# Patient Record
Sex: FEMALE | Race: WHITE | NOT HISPANIC OR LATINO | Employment: FULL TIME | URBAN - METROPOLITAN AREA
[De-identification: names, ages, dates, MRNs, and addresses within clinical notes are randomized per-mention and may not be internally consistent; named-entity substitution may affect disease eponyms.]

---

## 2023-09-12 ENCOUNTER — OUTSIDE FACILITY SERVICE (OUTPATIENT)
Dept: CARDIOLOGY | Facility: CLINIC | Age: 48
End: 2023-09-12
Payer: COMMERCIAL

## 2025-02-12 ENCOUNTER — TELEPHONE (OUTPATIENT)
Dept: NEUROLOGY | Facility: CLINIC | Age: 50
End: 2025-02-12
Payer: COMMERCIAL

## 2025-02-12 NOTE — TELEPHONE ENCOUNTER
Pt scheduled with Dr. German for 2/28/25 for dx cerebral ischemia. He will not be in office this day so I called to reschedule and spoke with pt to offer times tomorrow and Friday. She is not available then and opted to cancel and not reschedule at this time.     ALEX Cope

## 2025-04-03 ENCOUNTER — TELEPHONE (OUTPATIENT)
Dept: SURGERY | Facility: CLINIC | Age: 50
End: 2025-04-03
Payer: COMMERCIAL

## 2025-04-03 RX ORDER — POLYETHYLENE GLYCOL 3350 17 G/17G
POWDER, FOR SOLUTION ORAL
Qty: 238 G | Refills: 0 | Status: SHIPPED | OUTPATIENT
Start: 2025-04-03

## 2025-04-03 RX ORDER — BISACODYL 5 MG
TABLET, DELAYED RELEASE (ENTERIC COATED) ORAL
Qty: 4 TABLET | Refills: 0 | Status: SHIPPED | OUTPATIENT
Start: 2025-04-03

## 2025-04-03 NOTE — TELEPHONE ENCOUNTER
Pt would like to be scheduled at [facility] On 05/09 W/ Dr. Torres-verified pharmacy/insurance. Thank you    *I have requested her last colon report from [facility] for review as well*

## 2025-04-03 NOTE — TELEPHONE ENCOUNTER
Received and Indexed pt's last colonoscopy report from [facility]. Pt's last colon was on 05/15/2020

## 2025-04-03 NOTE — TELEPHONE ENCOUNTER
PRESCREENING FOR OPEN ACCESS SCHEDULING    Cayla Obrien, 1975  [id number]    04/03/25    If, the patient answers yes to any of the following questions the provider will be informed prior to scheduling open access for approval and documented in the chart.    [x]  Yes  [] No    1. Have you ever had a colonoscopy in the past?      When:  about 5 yrs       Where: [place]       Polyps or other:     [x]  Yes  [] No    2. Family history of colon cancer?      Relation: Father       Age of onset:       Do you currently have any of the following?    []  Yes  [x] No  Rectal bleeding, if so, how long?     []  Yes  [x] No  Abdominal pain, if so, how long?    []  Yes  [x] No  Constipation, if so, how long?    []  Yes  [x] No  Diarrhea, if so, how long?    []  Yes  [x] No  Weight loss, is so, how much?    [] Yes  [x] No  Small caliber stool, if so, how long?    []Yes  [x] No  Do you have Hemorroids?    []Yes  [x] No  Have you been diagnosed with Anemia?    []Yes  [x] No  Do you have difficulty swallowing?  []Yes  [x] No  Do you have a history of esophageal stricture or dilation?(If yes do not schedule with Dr Sherwood)    []Yes  [x] No  Do you have acid reflux?    Have you ever had any of the following conditions?    [] Yes  [x] No  Heart attack?      When?       Last cardiac workup?     Current Cardiologist? No      Blood thinners?    [] Yes  [x] No   Lung problems, asthma or COPD?  [] Yes  [x] No  Oxygen required?       [] Yes  [x] No  Stroke?     [] Yes  [x] No  Have you ever had a reaction to anesthesia?

## 2025-04-03 NOTE — TELEPHONE ENCOUNTER
Miralax prep sent to pharmacy.  Chart reviewed and ok to proceed as open access. Patient last colon 5 years ago at [facility] with [facility]. Strong family history

## 2025-04-04 ENCOUNTER — PREP FOR SURGERY (OUTPATIENT)
Dept: OTHER | Facility: HOSPITAL | Age: 50
End: 2025-04-04
Payer: COMMERCIAL

## 2025-04-04 DIAGNOSIS — Z12.11 SCREENING FOR COLON CANCER: Primary | ICD-10-CM

## 2025-04-04 NOTE — TELEPHONE ENCOUNTER
PUT IN OR BOOK, INSTRUCTIONS,MAILED, CASE REQUEST SENT   Hemigard Postcare Instructions: The HEMIGARD strips are to remain completely dry for at least 5-7 days.

## 2025-04-09 ENCOUNTER — PREP FOR SURGERY (OUTPATIENT)
Dept: OTHER | Facility: HOSPITAL | Age: 50
End: 2025-04-09
Payer: COMMERCIAL

## 2025-04-09 DIAGNOSIS — Z80.0 FAMILY HISTORY OF COLON CANCER REQUIRING SCREENING COLONOSCOPY: Primary | ICD-10-CM

## 2025-05-05 RX ORDER — TIRZEPATIDE 12.5 MG/.5ML
INJECTION, SOLUTION SUBCUTANEOUS
COMMUNITY
Start: 2025-03-31

## 2025-05-05 RX ORDER — DULOXETIN HYDROCHLORIDE 60 MG/1
CAPSULE, DELAYED RELEASE ORAL
COMMUNITY
Start: 2025-02-27

## 2025-05-05 RX ORDER — BUMETANIDE 1 MG/1
1 TABLET ORAL DAILY
COMMUNITY
Start: 2025-02-03

## 2025-05-05 RX ORDER — TOPIRAMATE 50 MG/1
75 TABLET, FILM COATED ORAL 2 TIMES DAILY
COMMUNITY
Start: 2025-02-03

## 2025-05-05 RX ORDER — IBUPROFEN 800 MG/1
TABLET, FILM COATED ORAL
COMMUNITY
Start: 2025-02-03

## 2025-05-05 NOTE — PRE-PROCEDURE INSTRUCTIONS
PAT phone history completed with patient for upcoming procedure on 5/9/25.    PAT PASS reviewed with patient and he/she verbalized understanding of the following:     Do not eat or drink anything after midnight the night before procedure unless otherwise instructed by physician/surgeon's office, this includes no gum, candy, mints, tobacco products or e-cigarettes.  Do not shave the area to be operated on at least 48 hours prior to procedure.  Do not wear makeup, lotion, hair products, or nail polish.  Do not wear any jewelry and remove all piercings.  Do not wear any adhesive if you wear dentures.  Do not wear contacts; bring in glasses if needed.  Only take medications on the morning of procedure as instructed by PAT nurse per anesthesia guidelines or as instructed by physician's office.   If you are on any blood thinners reach out to the physician/surgeon's office for instructions on when/if they will need to be stopped prior to procedure.   Bring in picture ID and insurance card, advanced directive copies if applicable, CPAP/BIPAP/Inhalers if indicated morning of procedure, leave any other valuables at home.  Ensure you have arranged for someone to drive you home the day of your procedure and someone to care for you at home afterwards. It is recommended that you do not drive, drink alcohol, or make any major legal decisions for at least 24 hours after your procedure is complete.    Instructions given on hospital entrance and registration location.

## 2025-05-09 ENCOUNTER — HOSPITAL ENCOUNTER (OUTPATIENT)
Facility: HOSPITAL | Age: 50
Setting detail: HOSPITAL OUTPATIENT SURGERY
Discharge: HOME | End: 2025-05-09
Attending: SURGERY | Admitting: SURGERY
Payer: COMMERCIAL

## 2025-05-09 ENCOUNTER — ANESTHESIA (OUTPATIENT)
Dept: GASTROENTEROLOGY | Facility: HOSPITAL | Age: 50
End: 2025-05-09
Payer: COMMERCIAL

## 2025-05-09 ENCOUNTER — ANESTHESIA EVENT (OUTPATIENT)
Dept: GASTROENTEROLOGY | Facility: HOSPITAL | Age: 50
End: 2025-05-09
Payer: COMMERCIAL

## 2025-05-09 VITALS
RESPIRATION RATE: 18 BRPM | TEMPERATURE: 97.6 F | SYSTOLIC BLOOD PRESSURE: 100 MMHG | HEART RATE: 83 BPM | WEIGHT: 214 LBS | DIASTOLIC BLOOD PRESSURE: 68 MMHG | OXYGEN SATURATION: 98 % | BODY MASS INDEX: 35.61 KG/M2

## 2025-05-09 LAB
B-HCG UR QL: NEGATIVE
EXPIRATION DATE: NORMAL
GLUCOSE BLDC GLUCOMTR-MCNC: 92 MG/DL (ref 70–130)
INTERNAL NEGATIVE CONTROL: NORMAL
INTERNAL POSITIVE CONTROL: NORMAL
Lab: NORMAL

## 2025-05-09 PROCEDURE — 81025 URINE PREGNANCY TEST: CPT | Performed by: SURGERY

## 2025-05-09 PROCEDURE — 82948 REAGENT STRIP/BLOOD GLUCOSE: CPT

## 2025-05-09 PROCEDURE — S0260 H&P FOR SURGERY: HCPCS | Performed by: SURGERY

## 2025-05-09 PROCEDURE — 25010000002 PROPOFOL 10 MG/ML EMULSION: Performed by: NURSE ANESTHETIST, CERTIFIED REGISTERED

## 2025-05-09 PROCEDURE — 45378 DIAGNOSTIC COLONOSCOPY: CPT | Performed by: SURGERY

## 2025-05-09 RX ORDER — LIDOCAINE HCL/PF 100 MG/5ML
SYRINGE (ML) INJECTION AS NEEDED
Status: DISCONTINUED | OUTPATIENT
Start: 2025-05-09 | End: 2025-05-09 | Stop reason: SURG

## 2025-05-09 RX ORDER — ONDANSETRON 2 MG/ML
4 INJECTION INTRAMUSCULAR; INTRAVENOUS ONCE AS NEEDED
Status: DISCONTINUED | OUTPATIENT
Start: 2025-05-09 | End: 2025-05-09 | Stop reason: HOSPADM

## 2025-05-09 RX ORDER — SIMETHICONE 40MG/0.6ML
SUSPENSION, DROPS(FINAL DOSAGE FORM)(ML) ORAL AS NEEDED
Status: DISCONTINUED | OUTPATIENT
Start: 2025-05-09 | End: 2025-05-09 | Stop reason: HOSPADM

## 2025-05-09 RX ORDER — PROPOFOL 10 MG/ML
VIAL (ML) INTRAVENOUS AS NEEDED
Status: DISCONTINUED | OUTPATIENT
Start: 2025-05-09 | End: 2025-05-09 | Stop reason: SURG

## 2025-05-09 RX ADMIN — Medication 60 MG: at 12:40

## 2025-05-09 RX ADMIN — PROPOFOL 200 MG: 10 INJECTION, EMULSION INTRAVENOUS at 12:40

## 2025-05-09 RX ADMIN — PROPOFOL 200 MG: 10 INJECTION, EMULSION INTRAVENOUS at 12:49

## 2025-05-09 NOTE — ANESTHESIA POSTPROCEDURE EVALUATION
Patient: Cayla Obrien    Procedure Summary       Date: 05/09/25 Room / Location: [facility] ENDOSCOPY 3 / [facility] ENDOSCOPY    Anesthesia Start: 1235 Anesthesia Stop: 1251    Procedure: COLONOSCOPY Diagnosis:       Screening for colon cancer      (Screening for colon cancer [Z12.11])    Surgeons: Chris Torres MD Provider: Jorge Luna CRNA    Anesthesia Type: MAC ASA Status: 3            Anesthesia Type: MAC    Vitals  No vitals data found for the desired time range.          Post Anesthesia Care and Evaluation    Patient location during evaluation: bedside  Patient participation: complete - patient participated  Level of consciousness: awake  Pain score: 0  Pain management: adequate    Airway patency: patent  Anesthetic complications: No anesthetic complications  PONV Status: controlled  Cardiovascular status: acceptable and stable  Respiratory status: acceptable and room air  Hydration status: acceptable    Comments: See nursing documentation for post op vital signs

## 2025-05-09 NOTE — DISCHARGE INSTRUCTIONS
To assist you in voiding:  Drink plenty of fluids  Listen to running water while attempting to void.    If you are unable to urinate and you have an uncomfortable urge to void or it has been   6 hours since you were discharged, return to the Emergency RoomNo pushing, pulling, tugging,  heavy lifting, or strenuous activity.  No major decision making, driving, or drinking alcoholic beverages for 24 hours. ( due to the medications you have  received)  Always use good hand hygiene/washing techniques.  NO driving while taking pain medications.

## 2025-05-09 NOTE — ANESTHESIA PREPROCEDURE EVALUATION
Anesthesia Evaluation     Patient summary reviewed and Nursing notes reviewed   history of anesthetic complications (Spinal headache after epidural):   NPO Solid Status: > 8 hours  NPO Liquid Status: > 8 hours           Airway   Mallampati: II  TM distance: >3 FB  Neck ROM: full  Possible difficult intubation  Dental - normal exam     Pulmonary - normal exam   (+) ,shortness of breath  (-) not a smoker  Cardiovascular - normal exam  Exercise tolerance: good (4-7 METS)    (+) GOMEZ      Neuro/Psych  (+) headaches (migraines), psychiatric history Anxiety and Depression  GI/Hepatic/Renal/Endo    (+) obesity, GERD well controlled, diabetes mellitus type 2 well controlled    Musculoskeletal     Abdominal    Substance History      OB/GYN          Other   arthritis,     ROS/Med Hx Other: Last Mounjaro 4/29/25              Anesthesia Plan    ASA 3     MAC     (Risks and benefits discussed including risk of aspiration, recall and dental damage. All patient questions answered. Will continue with POC.)  intravenous induction     Anesthetic plan, risks, benefits, and alternatives have been provided, discussed and informed consent has been obtained with: patient.    Plan discussed with CRNA.    CODE STATUS:

## 2025-05-09 NOTE — H&P
"    [facility]   HISTORY AND PHYSICAL      Name:  Cayla Obrien   Age:  49 y.o.  Sex:  female  :  1975  MRN:  [id number]   Visit Number:  [id number]  Admission Date:  2025  Date Of Service:  25  Primary Care Physician:  Roula Quinonez MD    Chief Complaint:     I need a colonoscopy    History Of Presenting Illness:      Patient here for screening colonoscopy.  Strong family history of colon cancer with her father having had colorectal cancer.  No GI complaints.  Last colonoscopy at least 5 years ago.    Review Of Systems:     General ROS: Patient denies any fevers, chills or loss of consciousness.  No complaints of generalized weakness  Psychological ROS: Denies any hallucinations and delusions.  Respiratory ROS: Denies cough or shortness of breath.   Cardiovascular ROS: Denies chest pain or palpitations. No history of exertional chest pain.   Gastrointestinal ROS: Denies nausea and vomiting. Denies any abdominal pain. No diarrhea.   Genito-Urinary ROS: Denies dysuria or hematuria.  Neurological ROS: Denies any focal weakness. No loss of consciousness. Denies any numbness.   Dermatological ROS: Denies any redness or pruritis.     Past Medical History:    Past Medical History:   Diagnosis Date    Anxiety     Arthritis     Biliary dyskinesia     abnL HIDA 2018 w/ sx reproduction. EF 49%    Depression     Diabetes mellitus     Dyspepsia     Dyspnea on exertion     Elevated LDL cholesterol level     Fatigue     Gastritis     w/ intestinal metaplasia on EGD 10/2018 Dr. Torres    GERD (gastroesophageal reflux disease)     History of cardiovascular stress test     scanned into media    History of echocardiogram     scanned into media    IBS (irritable bowel syndrome)     Bentyl prn, worse w/ gallbladder attacks    Irregular heart beat     pt reports history of abnormal EKG - consistently the same every time - was \"cleared\" by [facility] cardiologist    Low " tolerance to pain medication     Migraines     Topamax w/ prn Imitrex    Morbid obesity     PCOS (polycystic ovarian syndrome)     Metformin + Mirena    Peripheral edema     Bumex BID    Psoriasis     bilat feet- uses topical     Seasonal allergies     Spinal headache     EPIDURAL WITH CHILD BIRTH    Wears glasses        Past Surgical history:    Past Surgical History:   Procedure Laterality Date    CHOLECYSTECTOMY N/A 8/15/2019    Procedure: CHOLECYSTECTOMY LAPAROSCOPIC;  Surgeon: Saurav Rosales MD;  Location:  [facility];  Service: General    ENDOSCOPY N/A 8/15/2019    Procedure: ESOPHAGOGASTRODUODENOSCOPY;  Surgeon: Saurav Rosales MD;  Location:  [facility];  Service: Bariatric    GASTRIC SLEEVE LAPAROSCOPIC N/A 8/15/2019    Procedure: GASTRIC SLEEVE LAPAROSCOPIC;  Surgeon: Saurav Rosales MD;  Location:  [facility];  Service: Bariatric    PARAESOPHAGEAL HERNIA REPAIR N/A 8/15/2019    Procedure: PARAESOPHAGEAL HERNIA REPAIR LAPAROSCOPIC;  Surgeon: Saurav Rosales MD;  Location:  [facility];  Service: Bariatric    TONSILLECTOMY  2010    WISDOM TOOTH EXTRACTION      all 4 removed- age 18        Social History:    Social History     Socioeconomic History    Marital status:    Tobacco Use    Smoking status: Never    Smokeless tobacco: Never   Vaping Use    Vaping status: Never Used   Substance and Sexual Activity    Alcohol use: Yes     Comment: social     Drug use: No    Sexual activity: Defer       Family History:    Family History   Problem Relation Age of Onset    Colon cancer Father 63    Breast cancer Paternal Aunt     Prostate cancer Paternal Uncle     Colon cancer Maternal Grandmother     Hypertension Mother     Breast cancer Paternal Grandmother     Stomach cancer Paternal Cousin     Prostate cancer Paternal Cousin     Lung cancer Paternal Cousin     Breast cancer Paternal Aunt     Breast cancer Paternal Aunt     Breast cancer Paternal Aunt     Breast cancer Paternal Aunt     Colon  cancer Paternal Great-Grandmother        Allergies:      Augmentin [amoxicillin-pot clavulanate] and Bactrim [sulfamethoxazole-trimethoprim]    Home Medications:    Prior to Admission Medications       Prescriptions Last Dose Informant Patient Reported? Taking?    bisacodyl (Dulcolax) 5 MG EC tablet 5/8/2025  No Yes    4 TABLETS TO BE TAKING AT TIME DIRECTED ON INSTRUCTED THE DAY PRIOR TO COLONOSCOPY.    bumetanide (BUMEX) 1 MG tablet Past Week  Yes Yes    Take 1 tablet by mouth Daily.    buPROPion XL (WELLBUTRIN XL) 150 MG 24 hr tablet Past Week Medication Bottle Yes Yes    Take 1 tablet by mouth Daily.    cetirizine (zyrTEC) 10 MG tablet Past Week Self Yes Yes    Take 1 tablet by mouth Daily As Needed.    DULoxetine (CYMBALTA) 60 MG capsule Past Week  Yes Yes    1 capsule orally once a day    fluticasone (FLONASE) 50 MCG/ACT nasal spray Past Week Self Yes Yes    Administer 2 sprays into the nostril(s) as directed by provider 2 (Two) Times a Day As Needed.    ibuprofen (ADVIL,MOTRIN) 800 MG tablet Past Week  Yes Yes    1 tablet with food or milk as needed Orally Two times a day    Levonorgestrel (MIRENA, 52 MG, IU)  Self Yes Yes    by Intrauterine route.    metFORMIN (GLUCOPHAGE) 500 MG tablet Past Week Medication Bottle Yes Yes    Take 1 tablet by mouth Daily With Breakfast.    Mounjaro 12.5 MG/0.5ML solution auto-injector 4/29/2025  Yes Yes    inject 0.5 ML WEEKLY    ondansetron (ZOFRAN) 4 MG tablet More than a month  No No    Take 1 tablet by mouth Every 4 (Four) Hours As Needed for Nausea.    polyethylene glycol (MiraLax) 17 GM/SCOOP powder 5/8/2025  No Yes     GRAM POWDER WITH 64 OZ OF CLEAR LIQUID. USE AS DIRECTED    promethazine (PHENERGAN) 12.5 MG tablet   No No    Take 1 tablet by mouth Every 4 (Four) Hours As Needed for Nausea.    SUMAtriptan (IMITREX) 100 MG tablet More than a month Self Yes No    Take 1 tablet by mouth 1 (One) Time As Needed for Migraine. Take one tablet at onset of headache. May  repeat dose one time in 2 hours if headache not relieved.    topiramate (TOPAMAX) 50 MG tablet Past Month  Yes Yes    Take 1.5 tablets by mouth 2 (Two) Times a Day.               ED Medications:    Medications - No data to display    Vital Signs:    Temp:  [97.7 °F (36.5 °C)] 97.7 °F (36.5 °C)  Heart Rate:  [99] 99  Resp:  [16] 16  BP: (113)/(90) 113/90        05/05/25  1138   Weight: 97.1 kg (214 lb)       Body mass index is 35.61 kg/m².    Physical Exam:      General Appearance:  Alert and cooperative, not in any acute distress.   Head:  Atraumatic and normocephalic, without obvious abnormality.   Eyes:          PERRLA, conjunctivae and sclerae normal, no Icterus. No pallor. Extra-occular movements are within normal limits.   Ears:  Ears appear intact with no abnormalities noted.   Respiratory/Lungs:   Breath sounds heard bilaterally equally.  No crackles or wheezing. No Pleural rub or bronchial breathing. Normal respiratory effort.    Cardiovascular/Heart:  Normal S1 and S2,    GI/Abdomen:   No masses, no hepatosplenomegaly. Soft, non-tender, non-distended, no hernia                 Musculoskeletal/ Extremities:   Moves all extremities well   Skin: No bleeding, bruising or rash, no induration   Psychiatric : Alert and oriented ×3.  No depression or anxiety    Neurologic: Cranial nerves 2 - 12 grossly intact, sensation intact, Motor power is normal and equal bilaterally.       EKG:          Labs:    Lab Results (last 24 hours)       Procedure Component Value Units Date/Time    POC Pregnancy, Urine [[id number]] Collected: 05/09/25 1151    Specimen: Urine Updated: 05/09/25 1152     HCG, Urine, QL Negative     Lot Number #[id number]     Internal Positive Control Passed     Internal Negative Control Passed     Expiration Date 2026-08-05    POC Glucose Once [[id number]]  (Normal) Collected: 05/09/25 1132    Specimen: Blood Updated: 05/09/25 1135     Glucose 92 mg/dL      Comment: Serial Number: [id number]:   [id number]               Radiology:    Imaging Results (Last 72 Hours)       ** No results found for the last 72 hours. **            Assessment:    Screening colonoscopy with strong family history of colon cancer    Plan:     I recommend a screening colonoscopy to the patient.  The patient understands the procedure and the reason for the procedure.  The patient also understands the risks which include but are not limited to bleeding and perforation and they understand the ramifications of these potential complications including operative intervention and wish to proceed.    Chris Torres MD  05/09/25  12:29 EDT

## (undated) DEVICE — ENDOSCOPY PORT CONNECTOR FOR OLYMPUS® SCOPES: Brand: ERBE

## (undated) DEVICE — VLV SXN AIR/H2O ORCAPOD3 1P/U STRL

## (undated) DEVICE — Device

## (undated) DEVICE — HYBRID CO2 TUBING/CAP SET FOR OLYMPUS® SCOPES & CO2 SOURCE: Brand: ERBE

## (undated) DEVICE — PATIENT RETURN ELECTRODE, SINGLE-USE, CONTACT QUALITY MONITORING, ADULT, WITH 9FT CORD, FOR PATIENTS WEIGING OVER 33LBS. (15KG): Brand: MEGADYNE